# Patient Record
Sex: MALE | Race: BLACK OR AFRICAN AMERICAN | NOT HISPANIC OR LATINO | URBAN - METROPOLITAN AREA
[De-identification: names, ages, dates, MRNs, and addresses within clinical notes are randomized per-mention and may not be internally consistent; named-entity substitution may affect disease eponyms.]

---

## 2018-11-03 ENCOUNTER — INPATIENT (INPATIENT)
Facility: HOSPITAL | Age: 58
LOS: 2 days | Discharge: HOME | End: 2018-11-06
Attending: INTERNAL MEDICINE | Admitting: INTERNAL MEDICINE
Payer: COMMERCIAL

## 2018-11-03 VITALS
OXYGEN SATURATION: 100 % | RESPIRATION RATE: 17 BRPM | DIASTOLIC BLOOD PRESSURE: 86 MMHG | SYSTOLIC BLOOD PRESSURE: 130 MMHG | HEART RATE: 52 BPM | WEIGHT: 175.05 LBS | HEIGHT: 70 IN | TEMPERATURE: 98 F

## 2018-11-03 DIAGNOSIS — I10 ESSENTIAL (PRIMARY) HYPERTENSION: ICD-10-CM

## 2018-11-03 DIAGNOSIS — G45.9 TRANSIENT CEREBRAL ISCHEMIC ATTACK, UNSPECIFIED: ICD-10-CM

## 2018-11-03 LAB
ALBUMIN SERPL ELPH-MCNC: 3.8 G/DL — SIGNIFICANT CHANGE UP (ref 3.3–5)
ALP SERPL-CCNC: 63 U/L — SIGNIFICANT CHANGE UP (ref 40–120)
ALT FLD-CCNC: 36 U/L — SIGNIFICANT CHANGE UP (ref 12–78)
ANION GAP SERPL CALC-SCNC: 8 MMOL/L — SIGNIFICANT CHANGE UP (ref 5–17)
AST SERPL-CCNC: 28 U/L — SIGNIFICANT CHANGE UP (ref 15–37)
BILIRUB SERPL-MCNC: 0.6 MG/DL — SIGNIFICANT CHANGE UP (ref 0.2–1.2)
BUN SERPL-MCNC: 16 MG/DL — SIGNIFICANT CHANGE UP (ref 7–23)
CALCIUM SERPL-MCNC: 8.8 MG/DL — SIGNIFICANT CHANGE UP (ref 8.5–10.1)
CHLORIDE SERPL-SCNC: 107 MMOL/L — SIGNIFICANT CHANGE UP (ref 96–108)
CK MB BLD-MCNC: 0.7 % — SIGNIFICANT CHANGE UP (ref 0–3.5)
CK MB CFR SERPL CALC: 2.5 NG/ML — SIGNIFICANT CHANGE UP (ref 0.5–3.6)
CK SERPL-CCNC: 347 U/L — HIGH (ref 26–308)
CO2 SERPL-SCNC: 26 MMOL/L — SIGNIFICANT CHANGE UP (ref 22–31)
CREAT SERPL-MCNC: 1.05 MG/DL — SIGNIFICANT CHANGE UP (ref 0.5–1.3)
D DIMER BLD IA.RAPID-MCNC: <150 NG/ML DDU — SIGNIFICANT CHANGE UP
GLUCOSE SERPL-MCNC: 89 MG/DL — SIGNIFICANT CHANGE UP (ref 70–99)
HCT VFR BLD CALC: 43.2 % — SIGNIFICANT CHANGE UP (ref 39–50)
HGB BLD-MCNC: 13.6 G/DL — SIGNIFICANT CHANGE UP (ref 13–17)
MCHC RBC-ENTMCNC: 27.9 PG — SIGNIFICANT CHANGE UP (ref 27–34)
MCHC RBC-ENTMCNC: 31.5 GM/DL — LOW (ref 32–36)
MCV RBC AUTO: 88.5 FL — SIGNIFICANT CHANGE UP (ref 80–100)
NRBC # BLD: 0 /100 WBCS — SIGNIFICANT CHANGE UP (ref 0–0)
PLATELET # BLD AUTO: 93 K/UL — LOW (ref 150–400)
POTASSIUM SERPL-MCNC: 4.1 MMOL/L — SIGNIFICANT CHANGE UP (ref 3.5–5.3)
POTASSIUM SERPL-SCNC: 4.1 MMOL/L — SIGNIFICANT CHANGE UP (ref 3.5–5.3)
PROT SERPL-MCNC: 6.9 GM/DL — SIGNIFICANT CHANGE UP (ref 6–8.3)
RBC # BLD: 4.88 M/UL — SIGNIFICANT CHANGE UP (ref 4.2–5.8)
RBC # FLD: 13.4 % — SIGNIFICANT CHANGE UP (ref 10.3–14.5)
SODIUM SERPL-SCNC: 141 MMOL/L — SIGNIFICANT CHANGE UP (ref 135–145)
TROPONIN I SERPL-MCNC: <.015 NG/ML — SIGNIFICANT CHANGE UP (ref 0.01–0.04)
WBC # BLD: 3.72 K/UL — LOW (ref 3.8–10.5)
WBC # FLD AUTO: 3.72 K/UL — LOW (ref 3.8–10.5)

## 2018-11-03 PROCEDURE — 93306 TTE W/DOPPLER COMPLETE: CPT | Mod: 26

## 2018-11-03 PROCEDURE — 71045 X-RAY EXAM CHEST 1 VIEW: CPT | Mod: 26

## 2018-11-03 PROCEDURE — 71275 CT ANGIOGRAPHY CHEST: CPT | Mod: 26

## 2018-11-03 PROCEDURE — 70450 CT HEAD/BRAIN W/O DYE: CPT | Mod: 26

## 2018-11-03 PROCEDURE — 93010 ELECTROCARDIOGRAM REPORT: CPT

## 2018-11-03 PROCEDURE — 99285 EMERGENCY DEPT VISIT HI MDM: CPT

## 2018-11-03 RX ORDER — ASPIRIN/CALCIUM CARB/MAGNESIUM 324 MG
325 TABLET ORAL ONCE
Qty: 0 | Refills: 0 | Status: COMPLETED | OUTPATIENT
Start: 2018-11-03 | End: 2018-11-03

## 2018-11-03 RX ORDER — ASPIRIN/CALCIUM CARB/MAGNESIUM 324 MG
81 TABLET ORAL DAILY
Qty: 0 | Refills: 0 | Status: DISCONTINUED | OUTPATIENT
Start: 2018-11-03 | End: 2018-11-06
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
## 2018-11-03 NOTE — H&P ADULT - NSHPLABSRESULTS_GEN_ALL_CORE
CARDIAC MARKERS ( 03 Nov 2018 16:48 )  <.015 ng/mL / x     / 347 U/L / x     / 2.5 ng/mL    CBC Full  -  ( 03 Nov 2018 16:48 )  WBC Count : 3.72 K/uL  Hemoglobin : 13.6 g/dL  Hematocrit : 43.2 %  Platelet Count - Automated : 93 K/uL  Mean Cell Volume : 88.5 fl  Mean Cell Hemoglobin : 27.9 pg  Mean Cell Hemoglobin Concentration : 31.5 gm/dL  Auto Neutrophil # : x  Auto Lymphocyte # : x  Auto Monocyte # : x  Auto Eosinophil # : x  Auto Basophil # : x  Auto Neutrophil % : x  Auto Lymphocyte % : x  Auto Monocyte % : x  Auto Eosinophil % : x  Auto Basophil % : x  CBC Full  -  ( 03 Nov 2018 16:48 )  WBC Count : 3.72 K/uL  Hemoglobin : 13.6 g/dL  Hematocrit : 43.2 %  Platelet Count - Automated : 93 K/uL  Mean Cell Volume : 88.5 fl  Mean Cell Hemoglobin : 27.9 pg  Mean Cell Hemoglobin Concentration : 31.5 gm/dL  Auto Neutrophil # : x  Auto Lymphocyte # : x  Auto Monocyte # : x  Auto Eosinophil # : x  Auto Basophil # : x  Auto Neutrophil % : x  Auto Lymphocyte % : x  Auto Monocyte % : x  Auto Eosinophil % : x  Auto Basophil % : x
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
## 2018-11-03 NOTE — ED PROVIDER NOTE - OBJECTIVE STATEMENT
58 years old male sent here by pmd Dr. Case for sudden onset of room spinning dizziness which is resolved  this morning. Pt also c/o bilateral chest pain intermittently for more than several years. Pt denies recent hx of trauma, headache, blurred visions, light sensitivities, focal/distal weakness or numbness, cough, sob, nausea, vomiting, fever, chills, abd pain, dysuria or irregular bowel movements or recent hx of long traveling.

## 2018-11-03 NOTE — ED ADULT NURSE NOTE - NSIMPLEMENTINTERV_GEN_ALL_ED
Implemented All Universal Safety Interventions:  [place] to call system. Call bell, personal items and telephone within reach. Instruct patient to call for assistance. Room bathroom lighting operational. Non-slip footwear when patient is off stretcher. Physically safe environment: no spills, clutter or unnecessary equipment. Stretcher in lowest position, wheels locked, appropriate side rails in place.
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
## 2018-11-03 NOTE — ED ADULT NURSE NOTE - CHEST MOVEMENT
05/21/18 Per [employer], Person pressed touch tone key to speak with an endoscopy .  Pt has been scheduled for colonoscopy on 07/19/18. Oral instructions given and mailed. Suprep was already ordered.  
symmetric

## 2018-11-03 NOTE — ED PROVIDER NOTE - CONSTITUTIONAL, MLM
normal... Well appearing, well nourished, awake, alert, oriented to person, place, time/situation and in no apparent distress. Speaking in clear full sentences no nasal flaring no shoulders retractions not holding his head/chest/abdomen, smiling appears very comfortable sitting up in the stretcher in a bright light room.

## 2018-11-04 PROCEDURE — 93010 ELECTROCARDIOGRAM REPORT: CPT

## 2018-11-04 PROCEDURE — 93880 EXTRACRANIAL BILAT STUDY: CPT | Mod: 26

## 2018-11-04 PROCEDURE — 70551 MRI BRAIN STEM W/O DYE: CPT | Mod: 26

## 2018-11-04 NOTE — PROGRESS NOTE ADULT - SUBJECTIVE AND OBJECTIVE BOX
Called by nurse to evaluate patient for bradycardia.  Patient initially presented w/ room-spinning dizziness, which has resolved.  Tele showed intermittent bradycardia into the high 40s-50s w/ APCs, patient at those times asymptomatic, worse when sleeping, no pauses or significant bradycardia into the 30s or lower.      T(F): 98.6 (11-04-18 @ 05:28), Max: 98.8 (11-04-18 @ 00:17)  HR: 55 (11-04-18 @ 05:28) (52 - 88)  BP: 114/76 (11-04-18 @ 05:28) (114/76 - 130/86)  RR: 16 (11-04-18 @ 05:28) (16 - 17)  SpO2: 97% (11-04-18 @ 05:28) (97% - 100%)  Wt(kg): --  CAPILLARY BLOOD GLUCOSE        PHYSICAL EXAM:  General: NAD, WDWN.   Neuro:  Alert & oriented x 3  HEENT: NCAT, EOMI, conjunctiva clear  CV: +S1+S2 regular rate and rhythm  Lung: clear to ausculation bilaterally, respirations nonlabored, good inspiratory effort  Abdomen: soft, NTND. Normactive BS  Extremities: no pedal edema or calf tenderness noted     LABS:                        13.6   3.72  )-----------( 93       ( 03 Nov 2018 16:48 )             43.2     11-03    141  |  107  |  16  ----------------------------<  89  4.1   |  26  |  1.05    Ca    8.8      03 Nov 2018 16:48    TPro  6.9  /  Alb  3.8  /  TBili  0.6  /  DBili  x   /  AST  28  /  ALT  36  /  AlkPhos  63  11-03      CARDIAC MARKERS ( 03 Nov 2018 16:48 )  <.015 ng/mL / x     / 347 U/L / x     / 2.5 ng/mL      I&O's Detail Called by nurse to evaluate patient for bradycardia.  Patient initially presented w/ room-spinning dizziness, which has resolved.  Tele showed intermittent bradycardia into the high 40s-50s w/ APCs, patient at those times asymptomatic, worse when sleeping, no pauses or significant bradycardia into the 30s or lower.      T(F): 98.6 (18 @ 05:28), Max: 98.8 (18 @ 00:17)  HR: 55 (18 @ 05:28) (52 - 88)  BP: 114/76 (18 @ 05:28) (114/76 - 130/86)  RR: 16 (18 @ 05:28) (16 - 17)  SpO2: 97% (18 @ 05:28) (97% - 100%)  Wt(kg): --  CAPILLARY BLOOD GLUCOSE        PHYSICAL EXAM:  General: NAD, WDWN.   Neuro:  Alert & oriented x 3  HEENT: NCAT, EOMI, conjunctiva clear  CV: +S1+S2 regular rate and rhythm  Lung: clear to ausculation bilaterally, respirations nonlabored, good inspiratory effort  Abdomen: soft, NTND. Normactive BS  Extremities: no pedal edema or calf tenderness noted     LABS:                        13.6   3.72  )-----------( 93       ( 2018 16:48 )             43.2     11-    141  |  107  |  16  ----------------------------<  89  4.1   |  26  |  1.05    Ca    8.8      2018 16:48    TPro  6.9  /  Alb  3.8  /  TBili  0.6  /  DBili  x   /  AST  28  /  ALT  36  /  AlkPhos  63  11-03      CARDIAC MARKERS ( 2018 16:48 )  <.015 ng/mL / x     / 347 U/L / x     / 2.5 ng/mL      Tele: sinus bradycardia 40-50s w/ APCs no pauses no VT, VF  [id number] Sinus Bradycardia @ 55bpm. DC interval 176ms. QRS duration 106ms. QT/QTc: 424/405ms

## 2018-11-04 NOTE — PROGRESS NOTE ADULT - ASSESSMENT
58 years old male sent here by pmd Dr. Case for sudden onset of room spinning dizziness which is resolved  this morning. Pt also c/o bilateral chest pain intermittently for more than several years. Pt denies recent hx of trauma, headache, blurred visions, light sensitivities, focal/distal weakness or numbness, cough, sob, nausea, vomiting, fever, chills, abd pain, dysuria or irregular bowel movements or recent hx of long traveling. (03 Nov 2018 17:43)    1) Intermittent Bradycardia (40-50s)  - avoid beta blockers, digoxin etc.  - bradycardia worse when sleeping into the 40s, no significant bradycardia into the 30s or pauses  - recommend cardiology consult  - recommend EP consult  - ordered Echo  - continue VTE and GI prophylaxis: heparin and Protonix IV  - f/u labs    2) Room-spinning dizziness- resolved

## 2018-11-04 NOTE — PROGRESS NOTE ADULT - ASSESSMENT
Subjective Complaints:  Historian:         REVIEW OF SYSTEMS:  Eyes:  Good vision, no reported pain  ENT:  No sore throat, pain, runny nose, dysphagia  CV:  No pain, palpitatioins, hypo/hypertension  Resp:  No dyspnea, cough, tachypnea, wheezing  GI:  No pain, nausea, vomiting, diarrhea, constipatiion  Muscle:  No pain, weakness  Neuro:  No weakness, tingling, memory problems  Psych:  No fatigue, insomnia, mood problems, depression  Endocrine:  No polyuria, polydypsia, cold/heat intolerance    Vital Signs Last 24 Hrs  T(C): 37.1 (04 Nov 2018 16:20), Max: 37.1 (04 Nov 2018 00:17)  T(F): 98.7 (04 Nov 2018 16:20), Max: 98.8 (04 Nov 2018 00:17)  HR: 52 (04 Nov 2018 16:20) (52 - 58)  BP: 119/68 (04 Nov 2018 16:20) (114/76 - 119/68)  BP(mean): --  RR: 16 (04 Nov 2018 16:20) (16 - 17)  SpO2: 98% (04 Nov 2018 16:20) (97% - 100%)    GENERAL PHYSICAL EXAM:  General:  Appears stated age, well-groomed, well-nourished, no distress  HEENT:  NC/AT, patent nares w/ pink mucosa, OP clear w/o lesions, PERRL, EOMI, conjunctivae clear, no thyromegaly, nodules, adenopathy, no JVD  Chest:  Full & symmetric excursion, no increased effort, breath sounds clear  Cardiovascular:  Regular rhythm, S1, S2, no murmur/rub/S3/S4, no carotid/femoral/abdominal bruit, radial/pedal pulses 2+, no edema  Abdomen:  Soft, non-tender, non-distended, normoactive bowel sounds, no HSM  Extremities:  Gait & station:   Digits:   Nails:   Joints, Bones, Muscles:   ROM:   Stability:  Skin:  No rash/erythema/ecchymoses/petechiae/wounds/abscess/warm/dry  Musculoskeletal:  Full ROM in all joints w/o swelling/tenderness/effusion    NEUROLOGICAL EXAM:  HENT:  Normocephalic head; atraumatic head.  Neck supple.  ENT: normal looking.  Mental State:    Alert.  Fully oriented to person, place and date.  Coherent.  Speech clear and intact.  Cooperative.  Responds appropriately.    Cranial Nerves:  II-XII:   Pupils round and reactive to light and accommodation.  Extraocular movements full.  Visual fields full (no homonymous hemianopsia).  Visual acuity wnl.  Facial symmetry intact.  Tongue midline.  Motor Functions:  Moves all extremities.  No pronator drift of UE.  Claps hand well.  Hand  intact bilaterally.  Ambulatory.    Sensory Functions:   Intact to touch and pinprick to face and extremities.    Reflexes:  Deep tendon reflexes normoactive to biceps, knees and ankles.  Babinski absent (present).  Cerebellar Testing:    Finger to nose intact.  Nystagmus absent.  Neurovascular: Carotid auscultation full without bruits.      LABS:                        13.6   3.72  )-----------( 93       ( 03 Nov 2018 16:48 )             43.2     11-03    141  |  107  |  16  ----------------------------<  89  4.1   |  26  |  1.05    Ca    8.8      03 Nov 2018 16:48    TPro  6.9  /  Alb  3.8  /  TBili  0.6  /  DBili  x   /  AST  28  /  ALT  36  /  AlkPhos  63  11-03     ass= awaek alert  speech fluent arm leg 4/5 no dizziness resolved mri curry no acute path for mra neck non focla exam htn wioll follow       RADIOLOGY & ADDITIONAL STUDIES:        Recommendations:  Brain MRI.  Carotid doppler.  Echocardiogram.  EEG.   DVT prophylaxis as ordered.  Medications:

## 2018-11-04 NOTE — PROGRESS NOTE ADULT - SUBJECTIVE AND OBJECTIVE BOX
Patient is a 58y old  Male who presents with a chief complaint of Dizziness (04 Nov 2018 05:30)      INTERVAL HPI/OVERNIGHT EVENTS:  No new complaints.  telemetry strip noted.  Cardiology eval appreciated.  MEDICATIONS  (STANDING):  aspirin  chewable 81 milliGRAM(s) Oral daily    MEDICATIONS  (PRN):      Allergies    quinine (Urticaria)    Intolerances        REVIEW OF SYSTEMS:  CONSTITUTIONAL: No fever, weight loss, or fatigue  EYES: No eye pain, visual disturbances, or discharge  ENMT:  No difficulty hearing, tinnitus, vertigo; No sinus or throat pain  NECK: No pain or stiffness  BREASTS: No pain, masses, or nipple discharge  RESPIRATORY: No cough, wheezing, chills or hemoptysis; No shortness of breath  CARDIOVASCULAR: No chest pain, palpitations, dizziness, or leg swelling  GASTROINTESTINAL: No abdominal or epigastric pain. No nausea, vomiting, or hematemesis; No diarrhea or constipation. No melena or hematochezia.  GENITOURINARY: No dysuria, frequency, hematuria, or incontinence  NEUROLOGICAL: No headaches, memory loss, loss of strength, numbness, or tremors  SKIN: No itching, burning, rashes, or lesions   LYMPH NODES: No enlarged glands  ENDOCRINE: No heat or cold intolerance; No hair loss  MUSCULOSKELETAL: No joint pain or swelling; No muscle, back, or extremity pain  PSYCHIATRIC: No depression, anxiety, mood swings, or difficulty sleeping  HEME/LYMPH: No easy bruising, or bleeding gums  ALLERGY AND IMMUNOLOGIC: No hives or eczema    Vital Signs Last 24 Hrs  T(C): 36.4 (04 Nov 2018 11:54), Max: 37.1 (04 Nov 2018 00:17)  T(F): 97.5 (04 Nov 2018 11:54), Max: 98.8 (04 Nov 2018 00:17)  HR: 53 (04 Nov 2018 11:54) (53 - 88)  BP: 119/68 (04 Nov 2018 11:54) (114/76 - 130/78)  BP(mean): --  RR: 17 (04 Nov 2018 11:54) (16 - 17)  SpO2: 100% (04 Nov 2018 11:54) (97% - 100%)    PHYSICAL EXAM:  GENERAL: NAD, well-groomed, well-developed  HEAD:  Atraumatic, Normocephalic  EYES: EOMI, PERRLA, conjunctiva and sclera clear  ENMT: No tonsillar erythema, exudates, or enlargement; Moist mucous membranes, Good dentition, No lesions  NECK: Supple, No JVD, Normal thyroid  NERVOUS SYSTEM:  Alert & Oriented X3, Good concentration; Motor Strength 5/5 B/L upper and lower extremities; DTRs 2+ intact and symmetric  CHEST/LUNG: Clear to percussion bilaterally; No rales, rhonchi, wheezing, or rubs  HEART: Regular rate and rhythm; No murmurs, rubs, or gallops  ABDOMEN: Soft, Nontender, Nondistended; Bowel sounds present  EXTREMITIES:  2+ Peripheral Pulses, No clubbing, cyanosis, or edema  LYMPH: No lymphadenopathy noted  SKIN: No rashes or lesions    LABS:                        13.6   3.72  )-----------( 93       ( 03 Nov 2018 16:48 )             43.2     11-03    141  |  107  |  16  ----------------------------<  89  4.1   |  26  |  1.05    Ca    8.8      03 Nov 2018 16:48    TPro  6.9  /  Alb  3.8  /  TBili  0.6  /  DBili  x   /  AST  28  /  ALT  36  /  AlkPhos  63  11-03        CAPILLARY BLOOD GLUCOSE        CULTURES:    HEMOGLOBIN A1C:    CHOLESTEROL:        RADIOLOGY & ADDITIONAL TESTS:

## 2018-11-04 NOTE — PROGRESS NOTE ADULT - ASSESSMENT
58 years old male sent here by pmd Dr. Case for sudden onset of room spinning dizziness which is resolved  this morning. Pt also c/o bilateral chest pain intermittently for more than several years. Pt denies recent hx of trauma, headache, blurred visions, light sensitivities, focal/distal weakness or numbness, cough, sob, nausea, vomiting, fever, chills, abd pain, dysuria or irregular bowel movements or recent hx of long traveling. (03 Nov 2018 17:43)    1) Intermittent Bradycardia (40-50s)  - avoid beta blockers, digoxin etc.  - bradycardia worse when sleeping into the 40s, no significant bradycardia into the 30s or pauses  - recommend cardiology consult  - recommend EP consult  - continue VTE and GI prophylaxis: heparin and Protonix IV  - f/u labs  - no need for pacer pads at this time    2) Room-spinning dizziness- resolved 58 years old male sent here by pmd Dr. Case for sudden onset of room spinning dizziness which is resolved  this morning. Pt also c/o bilateral chest pain intermittently for more than several years. Pt denies recent hx of trauma, headache, blurred visions, light sensitivities, focal/distal weakness or numbness, cough, sob, nausea, vomiting, fever, chills, abd pain, dysuria or irregular bowel movements or recent hx of long traveling. (03 Nov 2018 17:43)    1) Intermittent Bradycardia (40-50s)  - avoid beta blockers, digoxin etc.  - bradycardia worse when sleeping into the 40s, no significant bradycardia into the 30s or pauses  - recommend cardiology consult  - recommend EP consult  - ordered Echo  - continue VTE and GI prophylaxis: heparin and Protonix IV  - f/u labs    2) Room-spinning dizziness- resolved

## 2018-11-05 PROCEDURE — 70547 MR ANGIOGRAPHY NECK W/O DYE: CPT | Mod: 26

## 2018-11-05 RX ORDER — ASPIRIN/CALCIUM CARB/MAGNESIUM 324 MG
1 TABLET ORAL
Qty: 30 | Refills: 0 | OUTPATIENT
Start: 2018-11-05 | End: 2018-12-04

## 2018-11-05 NOTE — PROGRESS NOTE ADULT - ASSESSMENT
Subjective Complaints:  Historian:         REVIEW OF SYSTEMS:  Eyes:  Good vision, no reported pain  ENT:  No sore throat, pain, runny nose, dysphagia  CV:  No pain, palpitatioins, hypo/hypertension  Resp:  No dyspnea, cough, tachypnea, wheezing  GI:  No pain, nausea, vomiting, diarrhea, constipatiion  Muscle:  No pain, weakness  Neuro:  No weakness, tingling, memory problems  Psych:  No fatigue, insomnia, mood problems, depression  Endocrine:  No polyuria, polydypsia, cold/heat intolerance    Vital Signs Last 24 Hrs  T(C): 36.6 (05 Nov 2018 17:27), Max: 36.8 (05 Nov 2018 00:26)  T(F): 97.9 (05 Nov 2018 17:27), Max: 98.3 (05 Nov 2018 00:26)  HR: 65 (05 Nov 2018 17:27) (61 - 75)  BP: 135/79 (05 Nov 2018 17:27) (102/64 - 135/79)  BP(mean): --  RR: 18 (05 Nov 2018 17:27) (17 - 18)  SpO2: 98% (05 Nov 2018 17:27) (95% - 100%)    GENERAL PHYSICAL EXAM:  General:  Appears stated age, well-groomed, well-nourished, no distress  HEENT:  NC/AT, patent nares w/ pink mucosa, OP clear w/o lesions, PERRL, EOMI, conjunctivae clear, no thyromegaly, nodules, adenopathy, no JVD  Chest:  Full & symmetric excursion, no increased effort, breath sounds clear  Cardiovascular:  Regular rhythm, S1, S2, no murmur/rub/S3/S4, no carotid/femoral/abdominal bruit, radial/pedal pulses 2+, no edema  Abdomen:  Soft, non-tender, non-distended, normoactive bowel sounds, no HSM  Extremities:  Gait & station:   Digits:   Nails:   Joints, Bones, Muscles:   ROM:   Stability:  Skin:  No rash/erythema/ecchymoses/petechiae/wounds/abscess/warm/dry  Musculoskeletal:  Full ROM in all joints w/o swelling/tenderness/effusion    NEUROLOGICAL EXAM:  HENT:  Normocephalic head; atraumatic head.  Neck supple.  ENT: normal looking.  Mental State:    Alert.  Fully oriented to person, place and date.  Coherent.  Speech clear and intact.  Cooperative.  Responds appropriately.    Cranial Nerves:  II-XII:   Pupils round and reactive to light and accommodation.  Extraocular movements full.  Visual fields full (no homonymous hemianopsia).  Visual acuity wnl.  Facial symmetry intact.  Tongue midline.  Motor Functions:  Moves all extremities.  No pronator drift of UE.  Claps hand well.  Hand  intact bilaterally.  Ambulatory.    Sensory Functions:   Intact to touch and pinprick to face and extremities.    Reflexes:  Deep tendon reflexes normoactive to biceps, knees and ankles.  Babinski absent (present).  Cerebellar Testing:    Finger to nose intact.  Nystagmus absent.  Neurovascular: Carotid auscultation full without bruits.      LABS:       ass= krystle yoon speech fluent no diziness vertigo mri curry and mra brain no acute path arm leg 5/5 no seziure htn  non focla exam           RADIOLOGY & ADDITIONAL STUDIES:        Recommendations:  Brain MRI.  Carotid doppler.  Echocardiogram.  EEG.   DVT prophylaxis as ordered.  Medications:
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
## 2018-11-05 NOTE — DISCHARGE NOTE ADULT - MEDICATION SUMMARY - MEDICATIONS TO TAKE
I will START or STAY ON the medications listed below when I get home from the hospital:    aspirin 81 mg oral tablet, chewable  -- 1 tab(s) by mouth once a day  -- Indication: For vertigo

## 2018-11-05 NOTE — DISCHARGE NOTE ADULT - PATIENT PORTAL LINK FT
You can access the [facility] Patient Portal, offered by [facility], by registering with the following website: [email or web address]

## 2018-11-05 NOTE — DISCHARGE NOTE ADULT - CARE PLAN
Principal Discharge DX:	Vertigo  Goal:	MRI, MRA negative  Assessment and plan of treatment:	Follow with me in the office  Secondary Diagnosis:	Essential hypertension

## 2018-11-05 NOTE — DISCHARGE NOTE ADULT - NS AS DC FOLLOWUP STROKE INST
Stroke (includes: TIA/SAH/ICH/Ischemic Stroke)/Influenza vaccination (VIS Pub Date: August 7, 2015) Influenza vaccination (VIS Pub Date: August 7, 2015) Smoking Cessation/Influenza vaccination (VIS Pub Date: August 7, 2015)

## 2018-11-05 NOTE — DISCHARGE NOTE ADULT - CARE PROVIDER_API CALL
Laura Case), Internal Medicine  [address] [facility] [place] [address]  Phone: [phone]  Fax: [phone]

## 2018-11-05 NOTE — DISCHARGE NOTE ADULT - NS AS DC STROKE ED MATERIALS
Stroke Education Booklet/Prescribed Medications/Need for Followup After Discharge/Call 911 for Stroke/Risk Factors for Stroke/Stroke Warning Signs and Symptoms Risk Factors for Stroke/Prescribed Medications/Need for Followup After Discharge/Call 911 for Stroke/Stroke Warning Signs and Symptoms/Stroke Education Booklet

## 2018-11-06 VITALS
TEMPERATURE: 98 F | HEART RATE: 98 BPM | RESPIRATION RATE: 18 BRPM | DIASTOLIC BLOOD PRESSURE: 80 MMHG | SYSTOLIC BLOOD PRESSURE: 130 MMHG | OXYGEN SATURATION: 99 %

## 2018-11-06 LAB
CHOLEST SERPL-MCNC: 159 MG/DL — SIGNIFICANT CHANGE UP (ref 10–199)
HDLC SERPL-MCNC: 46 MG/DL — SIGNIFICANT CHANGE UP
LIPID PNL WITH DIRECT LDL SERPL: 98 MG/DL — SIGNIFICANT CHANGE UP
TOTAL CHOLESTEROL/HDL RATIO MEASUREMENT: 3.5 RATIO — SIGNIFICANT CHANGE UP (ref 3.4–9.6)
TRIGL SERPL-MCNC: 74 MG/DL — SIGNIFICANT CHANGE UP (ref 10–149)

## 2018-11-08 DIAGNOSIS — Z88.3 ALLERGY STATUS TO OTHER ANTI-INFECTIVE AGENTS: ICD-10-CM

## 2018-11-08 DIAGNOSIS — I10 ESSENTIAL (PRIMARY) HYPERTENSION: ICD-10-CM

## 2018-11-08 DIAGNOSIS — R42 DIZZINESS AND GIDDINESS: ICD-10-CM

## 2018-11-08 DIAGNOSIS — R00.1 BRADYCARDIA, UNSPECIFIED: ICD-10-CM

## 2019-02-01 NOTE — ED ADULT TRIAGE NOTE - RESPIRATORY RATE (BREATHS/MIN)
[FreeTextEntry1] : Patient received MMR and TDAP vaccines today. Patient advised of adverse effects of medication including but not limited to muscle soreness at site of injection and general malaise  15

## 2020-08-22 NOTE — ED ADULT NURSE NOTE - NS ED NURSE RECORD ANOTHER VITAL SIGN
Implemented All Universal Safety Interventions:  [place] to call system. Call bell, personal items and telephone within reach. Instruct patient to call for assistance. Room bathroom lighting operational. Non-slip footwear when patient is off stretcher. Physically safe environment: no spills, clutter or unnecessary equipment. Stretcher in lowest position, wheels locked, appropriate side rails in place. (3) slightly limited Yes

## 2020-09-21 NOTE — ED ADULT NURSE NOTE - NS ED NURSE LEVEL OF CONSCIOUSNESS ORIENTATION
Oriented - self; Oriented - place; Oriented - time [Left Infraclavicular] : left infraclavicular area [Clean] : clean [Dry] : dry [Healing Well] : healing well [FreeTextEntry1] : No LE edema

## 2021-08-31 NOTE — ED ADULT NURSE NOTE - CHPI ED NUR TIMING2
JACQUIE called and reports they are still back up and are unaware when they weill be able to transport pt. [facility] critical care transport team called. Critical care transport is also backed up and cannot give us a time for transport. MD made aware. gradual onset

## 2023-07-27 NOTE — PHYSICAL THERAPY INITIAL EVALUATION ADULT - SHORT TERM MEMORY, REHAB EVAL
intact Tremfya Pregnancy And Lactation Text: The risk during pregnancy and breastfeeding is uncertain with this medication.

## 2025-06-27 ENCOUNTER — OUTPATIENT (OUTPATIENT)
Dept: OUTPATIENT SERVICES | Facility: HOSPITAL | Age: 65
LOS: 1 days | End: 2025-06-27
Payer: COMMERCIAL

## 2025-06-27 VITALS
SYSTOLIC BLOOD PRESSURE: 140 MMHG | DIASTOLIC BLOOD PRESSURE: 75 MMHG | RESPIRATION RATE: 15 BRPM | HEART RATE: 59 BPM | OXYGEN SATURATION: 98 %

## 2025-06-27 VITALS
DIASTOLIC BLOOD PRESSURE: 82 MMHG | RESPIRATION RATE: 18 BRPM | TEMPERATURE: 98 F | OXYGEN SATURATION: 100 % | SYSTOLIC BLOOD PRESSURE: 138 MMHG | HEART RATE: 51 BPM | WEIGHT: 179.9 LBS | HEIGHT: 68 IN

## 2025-06-27 DIAGNOSIS — R94.39 ABNORMAL RESULT OF OTHER CARDIOVASCULAR FUNCTION STUDY: ICD-10-CM

## 2025-06-27 PROBLEM — I10 ESSENTIAL (PRIMARY) HYPERTENSION: Chronic | Status: ACTIVE | Noted: 2018-11-03

## 2025-06-27 LAB
ANION GAP SERPL CALC-SCNC: 12 MMOL/L — SIGNIFICANT CHANGE UP (ref 7–14)
BUN SERPL-MCNC: 26 MG/DL — HIGH (ref 7–23)
CALCIUM SERPL-MCNC: 9.6 MG/DL — SIGNIFICANT CHANGE UP (ref 8.4–10.5)
CHLORIDE SERPL-SCNC: 102 MMOL/L — SIGNIFICANT CHANGE UP (ref 98–107)
CO2 SERPL-SCNC: 25 MMOL/L — SIGNIFICANT CHANGE UP (ref 22–31)
CREAT SERPL-MCNC: 1.44 MG/DL — HIGH (ref 0.5–1.3)
EGFR: 53 ML/MIN/1.73M2 — LOW
EGFR: 53 ML/MIN/1.73M2 — LOW
GLUCOSE SERPL-MCNC: 106 MG/DL — HIGH (ref 70–99)
HCT VFR BLD CALC: 40 % — SIGNIFICANT CHANGE UP (ref 39–50)
HGB BLD-MCNC: 12.7 G/DL — LOW (ref 13–17)
MCHC RBC-ENTMCNC: 27.7 PG — SIGNIFICANT CHANGE UP (ref 27–34)
MCHC RBC-ENTMCNC: 31.8 G/DL — LOW (ref 32–36)
MCV RBC AUTO: 87.3 FL — SIGNIFICANT CHANGE UP (ref 80–100)
NRBC # BLD AUTO: 0 K/UL — SIGNIFICANT CHANGE UP (ref 0–0)
NRBC # FLD: 0 K/UL — SIGNIFICANT CHANGE UP (ref 0–0)
NRBC BLD AUTO-RTO: 0 /100 WBCS — SIGNIFICANT CHANGE UP (ref 0–0)
PLATELET # BLD AUTO: 95 K/UL — LOW (ref 150–400)
PMV BLD: 13.7 FL — HIGH (ref 7–13)
POTASSIUM SERPL-MCNC: 4.9 MMOL/L — SIGNIFICANT CHANGE UP (ref 3.5–5.3)
POTASSIUM SERPL-SCNC: 4.9 MMOL/L — SIGNIFICANT CHANGE UP (ref 3.5–5.3)
RBC # BLD: 4.58 M/UL — SIGNIFICANT CHANGE UP (ref 4.2–5.8)
RBC # FLD: 13.4 % — SIGNIFICANT CHANGE UP (ref 10.3–14.5)
SODIUM SERPL-SCNC: 139 MMOL/L — SIGNIFICANT CHANGE UP (ref 135–145)
WBC # BLD: 4.5 K/UL — SIGNIFICANT CHANGE UP (ref 3.8–10.5)
WBC # FLD AUTO: 4.5 K/UL — SIGNIFICANT CHANGE UP (ref 3.8–10.5)

## 2025-06-27 PROCEDURE — 99152 MOD SED SAME PHYS/QHP 5/>YRS: CPT

## 2025-06-27 PROCEDURE — 93010 ELECTROCARDIOGRAM REPORT: CPT

## 2025-06-27 PROCEDURE — 93454 CORONARY ARTERY ANGIO S&I: CPT | Mod: 26

## 2025-06-27 RX ORDER — HYDROXYZINE HYDROCHLORIDE 25 MG/1
1 TABLET, FILM COATED ORAL
Refills: 0 | DISCHARGE

## 2025-06-27 RX ORDER — GABAPENTIN 400 MG/1
1 CAPSULE ORAL
Refills: 0 | DISCHARGE

## 2025-06-27 RX ORDER — LISINOPRIL 5 MG/1
1 TABLET ORAL
Refills: 0 | DISCHARGE

## 2025-06-27 RX ORDER — MELOXICAM 15 MG/1
1 TABLET ORAL
Refills: 0 | DISCHARGE

## 2025-06-27 RX ORDER — METOPROLOL SUCCINATE 50 MG/1
1 TABLET, EXTENDED RELEASE ORAL
Refills: 0 | DISCHARGE

## 2025-06-27 RX ORDER — ROSUVASTATIN CALCIUM 5 MG/1
1 TABLET, FILM COATED ORAL
Refills: 0 | DISCHARGE

## 2025-06-27 RX ADMIN — Medication 75 MILLILITER(S): at 10:28

## 2025-06-27 RX ADMIN — Medication 1000 MILLILITER(S): at 09:34

## 2025-06-27 NOTE — ASU DISCHARGE PLAN (ADULT/PEDIATRIC) - CARE PROVIDER_API CALL
Luiz Boo  Cardiovascular Disease  [address] [place] [address]  Phone: [phone]  Fax: [phone]  Established Patient  Follow Up Time: 2 weeks

## 2025-06-27 NOTE — ASU DISCHARGE PLAN (ADULT/PEDIATRIC) - FINANCIAL ASSISTANCE
[facility] provides services at a reduced cost to those who are determined to be eligible through [facility]’s financial assistance program. Information regarding [facility]’s financial assistance program can be found by going to [email or web address] or by calling [phone].

## 2025-06-27 NOTE — H&P CARDIOLOGY - HISTORY OF PRESENT ILLNESS
This is  a 64 yo male with a PMH of HTN, HLD  presented for elective cardiac cath. The patient states she has been having intermittent episodes of chest pain and underwent NST which was abnormal.  In light of pts symptoms and abnormal stress test, the patient was recommended cardiac catheterization with possible intervention if clinically indicated.  The process of the procedures along with the risk and benefits for the procedure were explained in detail which included but not limited to bleeding, infection, stroke, pericardial effusion,cardiac tamponade, vessels rupture, renal failure, intubation, and death.Patient expressed understanding an all questions were answered.  The patient denies dyspnea, palpitations, dizziness, presyncope, syncope,  headache, visual disturbances, CVA, PE, DVT, DESIRAE, abdominal pain, N/V/D/C, hematochezia, melena, dysuria, hematuria, fever, chills, ulcers, b/l lower extremities edema    Referral Dr Hollis Boo

## 2025-06-27 NOTE — H&P CARDIOLOGY - COMMENTS
Pre Procedural Sedation Evaluation    Urine pregnancy: N/A  Dentures: None  Last PO intake: 6/26/25 at 4pm  Obstructive sleep apnea: No  Aspiration risk: No  Mallampati score: 3  ASA Classification: 2  Prior Sedative or Anesthesia Experience: No  Informed consent by responsible adult: Yes  Responsible adult escort: Yes  Based on today's assessment, anesthesia consult requested: Yes

## 2025-06-27 NOTE — H&P CARDIOLOGY - REVIEW OF SYSTEMS
The patient denies dyspnea, palpitations, dizziness, presyncope, syncope,  headache, visual disturbances, CVA, PE, DVT, DESIRAE, abdominal pain, N/V/D/C, hematochezia, melena, dysuria, hematuria, fever, chills, ulcers, b/l lower extremities edema

## 2025-06-27 NOTE — ASU DISCHARGE PLAN (ADULT/PEDIATRIC) - ASU DC SPECIAL INSTRUCTIONSFT
WOUND CARE:  The day after your procedure:  - Remove the bandage at the site gently, clean with a small amount of soap and water, and pat try. Leave open to air.  - You may shower.  - Do not apply lotions, creams, ointments, powder, or perfumes to your incision site.  - Check your wrist or groin daily. A small amount of bruising or soreness is normal.  - Do not soak the procedural site for 1 week (no baths, pools, tubs, etc).  -----------------------------------------  ACTIVITY:  For the next 24 hours:  - Do not drive or operate heavy machinery.  - Do not drink any alcoholic beverages.  - Do not make any important decisions or sign legal documents.  ------------------------------------------------  If your procedure was performed through the wrist,  For the next 3 days:  - Avoid pushing and pulling with the affected wrist.   - Avoid repeated movement of the affected hand and wrist (typing, hammering).  - Do not lift anything more than 5 pounds.  --------------------------------------------------------------    MEDICATION:   - Take your medications as explained (see attached medication reconciliation on discharge paperwork).  -----------------------------------------------------------------  ADDITIONAL INSTRUCTIONS:  - Follow a heart healthy diet recommended by your doctor.   - If you smoke, we encourage smoking cessation. You may call [phone] for center of tobacco control if you need assistance.  - Call your doctor to make appointment within 2 weeks.  ----------------------------------------------------------  ***CALL YOUR DOCTOR***  - If you experience fever, chills, body aches, or severe pain, swelling, redness, heat, or yellow discharge from your incision site.  - If you notice bleeding or significant swelling at the procedural site.  - If you experience chest pain.  - If you experience extremity numbness, tingling, or temperature change.  ----------------------------------------------  If you are unable to get in contact with your doctor, you may contact the [facility] at [phone].  Please reference your discharge instructions for any questions or concerns.

## 2025-06-27 NOTE — ASU DISCHARGE PLAN (ADULT/PEDIATRIC) - FOR NEXT 24 HOURS DO NOT:
"Meryl Fournier presents for   Chief Complaint   Patient presents with     Hypertension     Recheck and refills.  Patient has been monitoring BP at home.  Results have been 120-140/70's.     . Inital /66  Pulse 63  Temp 97.9  F (36.6  C) (Tympanic)  Resp 16  Ht 5' 5\" (1.651 m)  Wt 190 lb 2 oz (86.2 kg)  BMI 31.64 kg/m2 Estimated body mass index is 31.64 kg/(m^2) as calculated from the following:    Height as of this encounter: 5' 5\" (1.651 m).    Weight as of this encounter: 190 lb 2 oz (86.2 kg).. BP completed using cuff size: travon Mccord CMA ([facility])  "
Statement Selected